# Patient Record
Sex: FEMALE | Race: WHITE | NOT HISPANIC OR LATINO | Employment: OTHER | URBAN - METROPOLITAN AREA
[De-identification: names, ages, dates, MRNs, and addresses within clinical notes are randomized per-mention and may not be internally consistent; named-entity substitution may affect disease eponyms.]

---

## 2018-03-13 ENCOUNTER — ESTABLISHED PATIENT (OUTPATIENT)
Dept: URBAN - METROPOLITAN AREA CLINIC 39 | Facility: CLINIC | Age: 83
End: 2018-03-13

## 2018-03-13 DIAGNOSIS — H43.813: ICD-10-CM

## 2018-03-13 DIAGNOSIS — H35.3131: ICD-10-CM

## 2018-03-13 DIAGNOSIS — H04.123: ICD-10-CM

## 2018-03-13 PROCEDURE — 92014 COMPRE OPH EXAM EST PT 1/>: CPT

## 2018-03-13 PROCEDURE — 4177F TALK PT/CRGVR RE AREDS PREV: CPT

## 2018-03-13 PROCEDURE — 92015 DETERMINE REFRACTIVE STATE: CPT

## 2018-03-13 PROCEDURE — 2019F DILATED MACUL EXAM DONE: CPT

## 2018-03-13 PROCEDURE — G8427 DOCREV CUR MEDS BY ELIG CLIN: HCPCS

## 2018-03-13 PROCEDURE — 1036F TOBACCO NON-USER: CPT

## 2018-03-13 ASSESSMENT — VISUAL ACUITY
OD_CC: 20/20-2
OS_CC: 20/20
OD_CC: J3
OD_SC: 20/20-1
OS_SC: J12
OS_CC: J4
OS_SC: 20/25-2

## 2018-03-13 ASSESSMENT — TONOMETRY
OS_IOP_MMHG: 10
OD_IOP_MMHG: 9

## 2019-03-12 NOTE — PATIENT DISCUSSION
New Prescription: TobraDex (tobramycin-dexamethasone): ointment: 0.3-0.1% 1 a small amount at bedtime as directed into both eyes 03-

## 2019-03-12 NOTE — PATIENT DISCUSSION
CHALAZION OD:  PROCEED WITH INCISION AND DRAINAGE. PRESCRIBED WARM COMPRESSES, EYELID SCRUBS AND DOXYCYCLINE 100MG BID PO X 2 WEEKS AND TOBRADEX JABARI QHS X 2 WEEKS.

## 2019-03-12 NOTE — PATIENT DISCUSSION
New Prescription: doxycycline hyclate (doxycycline hyclate): tablet: 100 mg 1 tablet twice a day as directed by mouth 03-

## 2019-03-12 NOTE — PATIENT DISCUSSION
New Prescription: Maxitrol (neomycin-polymyxin-dexameth): ointment: 3.5-10,000-0.1 mg-unit/g-% a small amount at bedtime as directed into both eyes 03-

## 2019-05-23 ENCOUNTER — ESTABLISHED COMPREHENSIVE EXAM (OUTPATIENT)
Dept: URBAN - METROPOLITAN AREA CLINIC 39 | Facility: CLINIC | Age: 84
End: 2019-05-23

## 2019-05-23 DIAGNOSIS — H35.3131: ICD-10-CM

## 2019-05-23 DIAGNOSIS — H04.123: ICD-10-CM

## 2019-05-23 DIAGNOSIS — H18.59: ICD-10-CM

## 2019-05-23 DIAGNOSIS — H43.813: ICD-10-CM

## 2019-05-23 PROCEDURE — 92014 COMPRE OPH EXAM EST PT 1/>: CPT

## 2019-05-23 PROCEDURE — 92134 CPTRZ OPH DX IMG PST SGM RTA: CPT

## 2019-05-23 PROCEDURE — 92015 DETERMINE REFRACTIVE STATE: CPT

## 2019-05-23 ASSESSMENT — TONOMETRY
OD_IOP_MMHG: 10
OS_IOP_MMHG: 10

## 2019-05-23 ASSESSMENT — VISUAL ACUITY
OD_SC: 20/40+2
OS_SC: 20/40+2
OD_SC: J12
OS_CC: 20/20-2
OS_CC: J3
OS_SC: J12
OD_CC: J3
OD_CC: 20/20-2

## 2020-09-17 ENCOUNTER — ESTABLISHED COMPREHENSIVE EXAM (OUTPATIENT)
Dept: URBAN - METROPOLITAN AREA CLINIC 39 | Facility: CLINIC | Age: 85
End: 2020-09-17

## 2020-09-17 DIAGNOSIS — Z98.890: ICD-10-CM

## 2020-09-17 DIAGNOSIS — Z96.1: ICD-10-CM

## 2020-09-17 DIAGNOSIS — H18.59: ICD-10-CM

## 2020-09-17 DIAGNOSIS — H43.813: ICD-10-CM

## 2020-09-17 DIAGNOSIS — H04.123: ICD-10-CM

## 2020-09-17 DIAGNOSIS — H35.3131: ICD-10-CM

## 2020-09-17 DIAGNOSIS — H40.023: ICD-10-CM

## 2020-09-17 PROCEDURE — 92015 DETERMINE REFRACTIVE STATE: CPT

## 2020-09-17 PROCEDURE — 92133 CPTRZD OPH DX IMG PST SGM ON: CPT

## 2020-09-17 PROCEDURE — 92014 COMPRE OPH EXAM EST PT 1/>: CPT

## 2020-09-17 ASSESSMENT — VISUAL ACUITY
OD_SC: 20/40
OS_CC: J2
OD_CC: J2
OD_SC: J12
OU_CC: J1
OS_SC: 20/30-1
OS_CC: 20/25
OS_SC: J12
OU_SC: J10
OU_SC: 20/30
OD_CC: 20/20-1
OU_CC: 20/20

## 2020-09-17 ASSESSMENT — TONOMETRY
OS_IOP_MMHG: 24
OD_IOP_MMHG: 24

## 2020-10-01 ENCOUNTER — IOP CHECK (OUTPATIENT)
Dept: URBAN - METROPOLITAN AREA CLINIC 39 | Facility: CLINIC | Age: 85
End: 2020-10-01

## 2020-10-01 DIAGNOSIS — H18.593: ICD-10-CM

## 2020-10-01 DIAGNOSIS — H40.022: ICD-10-CM

## 2020-10-01 DIAGNOSIS — H40.1112: ICD-10-CM

## 2020-10-01 DIAGNOSIS — H04.123: ICD-10-CM

## 2020-10-01 PROCEDURE — 92012 INTRM OPH EXAM EST PATIENT: CPT

## 2020-10-01 PROCEDURE — 92083 EXTENDED VISUAL FIELD XM: CPT

## 2020-10-01 PROCEDURE — 76514 ECHO EXAM OF EYE THICKNESS: CPT

## 2020-10-01 RX ORDER — TRAVOPROST 0.04 MG/ML: 1 SOLUTION/ DROPS OPHTHALMIC EVERY EVENING

## 2020-10-01 ASSESSMENT — TONOMETRY
OD_IOP_MMHG: 24
OS_IOP_MMHG: 23

## 2020-10-01 ASSESSMENT — VISUAL ACUITY
OS_SC: 20/30-1
OD_SC: 20/20-1

## 2020-10-23 ENCOUNTER — IOP CHECK (OUTPATIENT)
Dept: URBAN - METROPOLITAN AREA CLINIC 39 | Facility: CLINIC | Age: 85
End: 2020-10-23

## 2020-10-23 DIAGNOSIS — H18.593: ICD-10-CM

## 2020-10-23 DIAGNOSIS — H40.1112: ICD-10-CM

## 2020-10-23 DIAGNOSIS — H40.022: ICD-10-CM

## 2020-10-23 DIAGNOSIS — H04.123: ICD-10-CM

## 2020-10-23 PROCEDURE — 92012 INTRM OPH EXAM EST PATIENT: CPT

## 2020-10-23 ASSESSMENT — VISUAL ACUITY
OD_CC: 20/20-2
OS_CC: 20/30+2

## 2020-10-23 ASSESSMENT — TONOMETRY
OD_IOP_MMHG: 18
OS_IOP_MMHG: 18

## 2021-04-26 ENCOUNTER — IOP CHECK (OUTPATIENT)
Dept: URBAN - METROPOLITAN AREA CLINIC 39 | Facility: CLINIC | Age: 86
End: 2021-04-26

## 2021-04-26 DIAGNOSIS — H18.593: ICD-10-CM

## 2021-04-26 DIAGNOSIS — H40.1112: ICD-10-CM

## 2021-04-26 DIAGNOSIS — H04.123: ICD-10-CM

## 2021-04-26 DIAGNOSIS — H40.022: ICD-10-CM

## 2021-04-26 PROCEDURE — 92012 INTRM OPH EXAM EST PATIENT: CPT

## 2021-04-26 ASSESSMENT — VISUAL ACUITY
OU_CC: 20/20-1
OS_CC: 20/25-1
OD_CC: 20/20-1

## 2021-04-26 ASSESSMENT — TONOMETRY
OS_IOP_MMHG: 17
OS_IOP_MMHG: 18
OD_IOP_MMHG: 18

## 2021-10-27 ENCOUNTER — ESTABLISHED COMPREHENSIVE EXAM (OUTPATIENT)
Dept: URBAN - METROPOLITAN AREA CLINIC 39 | Facility: CLINIC | Age: 86
End: 2021-10-27

## 2021-10-27 DIAGNOSIS — H40.1112: ICD-10-CM

## 2021-10-27 DIAGNOSIS — Z98.890: ICD-10-CM

## 2021-10-27 DIAGNOSIS — H04.123: ICD-10-CM

## 2021-10-27 DIAGNOSIS — H18.593: ICD-10-CM

## 2021-10-27 DIAGNOSIS — H35.3131: ICD-10-CM

## 2021-10-27 DIAGNOSIS — Z96.1: ICD-10-CM

## 2021-10-27 DIAGNOSIS — H43.813: ICD-10-CM

## 2021-10-27 DIAGNOSIS — H40.022: ICD-10-CM

## 2021-10-27 PROCEDURE — 92015 DETERMINE REFRACTIVE STATE: CPT

## 2021-10-27 PROCEDURE — 92014 COMPRE OPH EXAM EST PT 1/>: CPT

## 2021-10-27 PROCEDURE — 92250 FUNDUS PHOTOGRAPHY W/I&R: CPT

## 2021-10-27 ASSESSMENT — VISUAL ACUITY
OU_CC: J1+
OU_SC: J3
OD_SC: 20/40-1
OS_SC: J3
OU_SC: 20/40
OD_CC: J1+
OS_SC: 20/50-1
OS_CC: J1+
OD_SC: J10

## 2021-10-27 ASSESSMENT — TONOMETRY
OS_IOP_MMHG: 12
OD_IOP_MMHG: 12

## 2022-04-13 ENCOUNTER — FOLLOW UP (OUTPATIENT)
Dept: URBAN - METROPOLITAN AREA CLINIC 39 | Facility: CLINIC | Age: 87
End: 2022-04-13

## 2022-04-13 DIAGNOSIS — H40.1112: ICD-10-CM

## 2022-04-13 DIAGNOSIS — H04.123: ICD-10-CM

## 2022-04-13 DIAGNOSIS — H40.022: ICD-10-CM

## 2022-04-13 PROCEDURE — 92083 EXTENDED VISUAL FIELD XM: CPT

## 2022-04-13 PROCEDURE — 92012 INTRM OPH EXAM EST PATIENT: CPT

## 2022-04-13 ASSESSMENT — VISUAL ACUITY
OU_CC: 20/25-2
OD_CC: 20/25
OS_CC: 20/30-1

## 2022-04-13 ASSESSMENT — TONOMETRY
OS_IOP_MMHG: 17
OD_IOP_MMHG: 17

## 2022-10-13 ENCOUNTER — COMPREHENSIVE EXAM (OUTPATIENT)
Dept: URBAN - METROPOLITAN AREA CLINIC 39 | Facility: CLINIC | Age: 87
End: 2022-10-13

## 2022-10-13 DIAGNOSIS — H35.3131: ICD-10-CM

## 2022-10-13 DIAGNOSIS — Z96.1: ICD-10-CM

## 2022-10-13 DIAGNOSIS — H40.1112: ICD-10-CM

## 2022-10-13 DIAGNOSIS — H40.022: ICD-10-CM

## 2022-10-13 DIAGNOSIS — H04.123: ICD-10-CM

## 2022-10-13 DIAGNOSIS — H18.593: ICD-10-CM

## 2022-10-13 DIAGNOSIS — H43.813: ICD-10-CM

## 2022-10-13 PROCEDURE — 92015 DETERMINE REFRACTIVE STATE: CPT

## 2022-10-13 PROCEDURE — 92250 FUNDUS PHOTOGRAPHY W/I&R: CPT

## 2022-10-13 PROCEDURE — 92014 COMPRE OPH EXAM EST PT 1/>: CPT

## 2022-10-13 ASSESSMENT — VISUAL ACUITY
OU_SC: J7
OD_CC: 20/20
OD_CC: J1+
OS_CC: J1+
OU_SC: 20/20-1
OS_SC: J6
OU_CC: J1+
OD_SC: 20/20-1
OU_CC: 20/20-1
OD_SC: J7
OS_PH: 20/20
OS_SC: 20/50
OS_CC: 20/25-2

## 2022-10-13 ASSESSMENT — TONOMETRY
OS_IOP_MMHG: 21
OD_IOP_MMHG: 21

## 2023-04-17 ENCOUNTER — FOLLOW UP (OUTPATIENT)
Dept: URBAN - METROPOLITAN AREA CLINIC 39 | Facility: CLINIC | Age: 88
End: 2023-04-17

## 2023-04-17 DIAGNOSIS — H40.022: ICD-10-CM

## 2023-04-17 DIAGNOSIS — H04.123: ICD-10-CM

## 2023-04-17 DIAGNOSIS — H40.1112: ICD-10-CM

## 2023-04-17 PROCEDURE — 92012 INTRM OPH EXAM EST PATIENT: CPT

## 2023-04-17 PROCEDURE — 92083 EXTENDED VISUAL FIELD XM: CPT

## 2023-04-17 ASSESSMENT — TONOMETRY
OS_IOP_MMHG: 18
OD_IOP_MMHG: 19

## 2023-04-17 ASSESSMENT — VISUAL ACUITY
OS_CC: 20/30
OD_CC: 20/20-1